# Patient Record
Sex: FEMALE | Race: BLACK OR AFRICAN AMERICAN | NOT HISPANIC OR LATINO | URBAN - METROPOLITAN AREA
[De-identification: names, ages, dates, MRNs, and addresses within clinical notes are randomized per-mention and may not be internally consistent; named-entity substitution may affect disease eponyms.]

---

## 2018-12-07 ENCOUNTER — EMERGENCY (EMERGENCY)
Facility: HOSPITAL | Age: 38
LOS: 1 days | Discharge: HOME | End: 2018-12-07
Attending: EMERGENCY MEDICINE | Admitting: EMERGENCY MEDICINE
Payer: COMMERCIAL

## 2018-12-07 VITALS
DIASTOLIC BLOOD PRESSURE: 86 MMHG | HEART RATE: 96 BPM | OXYGEN SATURATION: 96 % | SYSTOLIC BLOOD PRESSURE: 137 MMHG | TEMPERATURE: 98 F | RESPIRATION RATE: 18 BRPM

## 2018-12-07 PROCEDURE — 99283 EMERGENCY DEPT VISIT LOW MDM: CPT

## 2018-12-07 PROCEDURE — 99284 EMERGENCY DEPT VISIT MOD MDM: CPT | Mod: 25

## 2018-12-07 NOTE — ED ADULT NURSE NOTE - OBJECTIVE STATEMENT
pt. reports that she was at work and experienced an asthma attack, relieved by MDI, work wanted her to come in for clearance, denies complaint at this time, nad, PE unremarkable

## 2018-12-07 NOTE — ED PROVIDER NOTE - NSFOLLOWUPINSTRUCTIONS_ED_ALL_ED_FT
Follow up with your primary care doctor  Use your inhaler as needed  You may return to work as tolerated  Return immediately for any new or worsening symptoms or any new concerns

## 2018-12-07 NOTE — ED PROVIDER NOTE - OBJECTIVE STATEMENT
37 yof pw asthma attack at work, felt sob, used inhaler, felt improved.  works in [place].  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  no cp, no abd pain, no leg swelling.  pt states ambulating w/o dyspnea.

## 2018-12-07 NOTE — ED ADULT NURSE NOTE - CHPI ED NUR SYMPTOMS NEG
no hemoptysis/no edema/no fever/no body aches/no cough/no chest pain/no chills/no wheezing/no diaphoresis/no shortness of breath/no headache

## 2018-12-07 NOTE — ED ADULT NURSE NOTE - NSIMPLEMENTINTERV_GEN_ALL_ED
Implemented All Universal Safety Interventions:  [place] to call system. Call bell, personal items and telephone within reach. Instruct patient to call for assistance. Room bathroom lighting operational. Non-slip footwear when patient is off stretcher. Physically safe environment: no spills, clutter or unnecessary equipment. Stretcher in lowest position, wheels locked, appropriate side rails in place.

## 2018-12-07 NOTE — ED PROVIDER NOTE - PHYSICAL EXAMINATION
CON: ao x 3, HENMT: clear oropharynx, soft neck, HEAD: atraumatic, CV: rrr, equal pulses b/l, no edema, RESP: cta b/l, MSK: no deformities, NEURO: no gross motor or sensory deficit

## 2018-12-07 NOTE — ED PROVIDER NOTE - MEDICAL DECISION MAKING DETAILS
sob, improved after inhaler, comfortable ambulating, no edema, no wheezing, no tachypnea, no resp distress

## 2018-12-11 DIAGNOSIS — J45.901 UNSPECIFIED ASTHMA WITH (ACUTE) EXACERBATION: ICD-10-CM

## 2020-09-01 PROBLEM — J45.909 UNSPECIFIED ASTHMA, UNCOMPLICATED: Chronic | Status: ACTIVE | Noted: 2018-12-07

## 2020-09-30 PROBLEM — Z00.00 ENCOUNTER FOR PREVENTIVE HEALTH EXAMINATION: Status: ACTIVE | Noted: 2020-09-30

## 2020-10-22 ENCOUNTER — APPOINTMENT (OUTPATIENT)
Dept: SURGERY | Facility: CLINIC | Age: 40
End: 2020-10-22

## 2023-08-14 NOTE — ED ADULT TRIAGE NOTE - ARRIVAL INFO ADDITIONAL COMMENTS
----- Message from Susan Romero RN sent at 8/14/2023  9:49 AM CDT -----    ----- Message -----  From: Blaze Arana MD  Sent: 8/14/2023   9:34 AM CDT  To: #    Inform Cherri that her US is normal     pt had an asthma attack at work, used her inhaler with good relief but job needs her cleared to come back.  pt denies sob, cough or wheezing.